# Patient Record
Sex: FEMALE | Race: WHITE
[De-identification: names, ages, dates, MRNs, and addresses within clinical notes are randomized per-mention and may not be internally consistent; named-entity substitution may affect disease eponyms.]

---

## 2017-02-12 ENCOUNTER — HOSPITAL ENCOUNTER (EMERGENCY)
Dept: HOSPITAL 25 - UCEAST | Age: 32
Discharge: HOME | End: 2017-02-12
Payer: COMMERCIAL

## 2017-02-12 VITALS — DIASTOLIC BLOOD PRESSURE: 69 MMHG | SYSTOLIC BLOOD PRESSURE: 128 MMHG

## 2017-02-12 DIAGNOSIS — J20.9: Primary | ICD-10-CM

## 2017-02-12 DIAGNOSIS — Z88.0: ICD-10-CM

## 2017-02-12 PROCEDURE — 87651 STREP A DNA AMP PROBE: CPT

## 2017-02-12 PROCEDURE — G0463 HOSPITAL OUTPT CLINIC VISIT: HCPCS

## 2017-02-12 PROCEDURE — 99212 OFFICE O/P EST SF 10 MIN: CPT

## 2017-02-20 NOTE — UC
Taya DOUGLAS Claudia, scribed for Raisa Walker MD on 17 at 0817 .





 General HPI





- HPI Summary


HPI Summary: 





31 years old female presents to the [facility] with multi- Sx this week. Pt notes 

gradual onset of Sx over the past 6 days. She admits to cough, sore throat, 

congestion, HA and sinus pressure. She also admits to some skin diaphoresis and 

"feeling warm" but is unsure of her temperature. 





- History of Current Complaint


Chief Complaint: UCRespiratory


Stated Complaint: SINUS CONGESTION


Time Seen by Provider: 17 08:08


Hx Obtained From: Patient


Onset/Duration: Gradual Onset, Lasting Days - about 6 days, Still Present


Associated Signs & Symptoms: Positive: Headache





- Allergy/Home Medications


Allergies/Adverse Reactions: 


 Allergies











Allergy/AdvReac Type Severity Reaction Status Date / Time


 


Penicillins Allergy Severe Hives Verified 16 17:00











Home Medications: 


 Home Medications





Guaifenesin/Pseudo 600/60(NF) [Mucinex D 600/60 (NF)]  17 [History]











PMH/Surg Hx/FS Hx/Imm Hx


Previously Healthy: Yes


Endocrine History Of: 


   Denies: Diabetes, Thyroid Disease


Cardiovascular History Of: 


   Denies: Cardiac Disorders, Hypertension


Respiratory History Of: 


   Denies: COPD, Asthma, Pneumonia


GI/ History Of: 


   Denies: Ulcer





- Surgical History


Surgical History: Yes


Surgery Procedure, Year, and Place:  , [place], [place], TUBAL LIGATION 

16





- Family History


Known Family History: Positive: Hypertension





- Social History


Occupation: Employed Full-time


Lives: With Family


Alcohol Use: None


Substance Use Type: None


Smoking Status (MU): Never Smoked Tobacco





Review of Systems


Constitutional: Other - SKIN DIAPHORESIS


Skin: Other - NO RASH


Eyes: Negative


ENT: Sore Throat, Ear Ache - RIGHT EAR, Other - CONGESTION AND SINUS PRESSURE


Respiratory: Cough


Cardiovascular: Negative


Gastrointestinal: Negative


Genitourinary: Negative


Motor: Negative


Neurovascular: Negative


Musculoskeletal: Negative


Neurological: Headache


Psychological: Negative


All Other Systems Reviewed And Are Negative: Yes





Physical Exam


Triage Information Reviewed: Yes


Appearance: Well-Nourished


Vital Signs: 


 Initial Vital Signs











Temp  98 F   17 07:41


 


Pulse  87   17 07:41


 


Resp  16   17 07:41


 


BP  135/69   17 07:41


 


Pulse Ox  99   17 07:41











Vital Signs Reviewed: Yes


Eye Exam: Normal


ENT Exam: Other


ENT: Positive: Other: - REDNESS OF THE POSTERIOR PHARYNX WITH MIDLINE UVULA 

MILD SWELLING


Dental Exam: Normal


Neck exam: Normal


Respiratory Exam: Normal


Respiratory: Positive: Chest non-tender, Lungs clear, Normal breath sounds


Cardiovascular Exam: Normal


Cardiovascular: Positive: RRR, No Murmur, Pulses Normal, Brisk Capillary Refill


Abdominal Exam: Normal


Abdomen Description: Positive: Nontender, No Organomegaly, Soft


Musculoskeletal Exam: Normal


Musculoskeletal: Positive: Strength Intact


Neurological Exam: Normal


Neurological: Positive: Other: - nonfocal grossly intact


Psychological Exam: Normal - conversing easily and appropriately


Skin Exam: Normal, Other - no visible or reported rash





Re-Evaluation





- Re-Evaluation


  ** 1


Re-Evaluation Time: 09:31


Comment: Results of the strep raid test is discussed with pt. Pt declines 

inhaler Rx.





Course/Dx





- Course


Course Of Treatment: No new problems in ccc, considered differential diagnoses.

  URI - not getting better (worse), c/w bronchitis.  D/w pt.  Suspect 

concomitant post viral cough as well.  Seems pleased with care.  Questions 

answered as posed.





- Differential Dx - Multi-Symptom


Provider Diagnoses: acute bronchitis





Discharge





- Discharge Plan


Condition: Stable


Disposition: HOME


Prescriptions: 


Azithromyxin ARLIN (NF) [Z-Arlin (Zithromax) 250 mg tabs #6] 2 tab PO .TODAY, THEN 

1 DAILY #6 tab


Patient Education Materials:  Acute Bronchitis (ED)


Forms:  *Work Release


Referrals: 


Non Staff,Doctor [Primary Care Provider] - 


[facility] [place] [facility] REFERRAL [Outside] (follow up in 1-2 weeks )


Additional Instructions: 


Follow up with a primary care physician as soon as you are able. 


Seek medical attention for worse or new problems in the meantime. 





The documentation as recorded by the Taya dejesus Claudia accurately 

reflects the service I personally performed and the decisions made by me, Raisa Walker MD.

## 2017-09-28 ENCOUNTER — HOSPITAL ENCOUNTER (OUTPATIENT)
Dept: HOSPITAL 25 - OR | Age: 32
Discharge: HOME | End: 2017-09-28
Attending: OBSTETRICS & GYNECOLOGY
Payer: COMMERCIAL

## 2017-09-28 VITALS — SYSTOLIC BLOOD PRESSURE: 117 MMHG | DIASTOLIC BLOOD PRESSURE: 80 MMHG

## 2017-09-28 DIAGNOSIS — D49.0: ICD-10-CM

## 2017-09-28 DIAGNOSIS — N92.0: Primary | ICD-10-CM

## 2017-09-28 LAB
HCT VFR BLD AUTO: 39 % (ref 35–47)
HGB BLD-MCNC: 13.2 G/DL (ref 12–16)
MCH RBC QN AUTO: 30 PG (ref 27–31)
MCHC RBC AUTO-ENTMCNC: 34 G/DL (ref 31–36)
MCV RBC AUTO: 87 FL (ref 80–97)
RBC # BLD AUTO: 4.4 10^6/UL (ref 4–5.4)
WBC # BLD AUTO: 6.2 10^3/UL (ref 3.5–10.8)

## 2017-09-28 PROCEDURE — 88305 TISSUE EXAM BY PATHOLOGIST: CPT

## 2017-09-28 PROCEDURE — 85025 COMPLETE CBC W/AUTO DIFF WBC: CPT

## 2017-09-28 PROCEDURE — 86850 RBC ANTIBODY SCREEN: CPT

## 2017-09-28 PROCEDURE — 36415 COLL VENOUS BLD VENIPUNCTURE: CPT

## 2017-09-28 PROCEDURE — 86901 BLOOD TYPING SEROLOGIC RH(D): CPT

## 2017-09-28 PROCEDURE — 86900 BLOOD TYPING SEROLOGIC ABO: CPT

## 2017-09-28 RX ADMIN — OXYCODONE HYDROCHLORIDE PRN MG: 5 CAPSULE ORAL at 10:07

## 2017-09-28 RX ADMIN — OXYCODONE HYDROCHLORIDE PRN MG: 5 CAPSULE ORAL at 09:15

## 2017-09-28 NOTE — OP
OPERATIVE REPORT:

 

DATE OF OPERATION:  09/28/17 - SDS

 

DATE OF BIRTH:  [birth year]

 

SURGEON:  Kannan Xavier MD.

 

ASSISTANT:  None.



ANESTHESIOLOGIST:  Oswald Dc MD.

 

ANESTHESIA:  General endotracheal tube.

 

PRE-OP DIAGNOSIS:  Menorrhagia.

 

POST-OP DIAGNOSIS:  Menorrhagia.

 

OPERATIVE PROCEDURE:  D and C, hysteroscopy, endometrial ablation.

 

ESTIMATED BLOOD LOSS:  Minimal.

 

SPECIMEN:  Includes endometrium.

 

FINDINGS:  Includes large normal cavity, secondary prolapse.

 

DESCRIPTION OF PROCEDURE:  Patient identified, procedure identified as D and C, 
hysteroscopy, and endometrial ablation.  The patient was taken to the operating 
room, prepped and draped in the usual fashion in dorsal lithotomy position 
under general anesthesia.  A single-tooth tenaculum was placed on the anterior 
lip of the cervix.  The cervix was sounded to 10 cm.  The cervical length was 
sounded to 4 cm, making the cavity length 6 cm.  The hysteroscope was inserted 
and above findings were noted.  A sharp curette was inserted and sharp 
curettage performed with good tissue obtained.  The NovaSure device was opened.
  The array was checked.  The device was placed within the uterine cavity.  The 
NovaSure was then manipulated to a cavity width of 4.3 cm, making the power 
setting of 142.  The CO2 perforation test was performed and the device passed.  
The device was enabled.  Then NovaSure ablation took place for 1 minute 9 
seconds.  At the end of the procedure, the device was removed and the 
hysteroscope was reinserted and a good ablation was noted and no perforations 
or injuries were noted.  All instruments were removed from the vagina.  
Hemostasis was achieved with silver nitrate sticks and the patient returned to 
the recovery room in stable condition.  All sponge, instrument counts were 
correct.

 

 [id number]/[facility] #: [id number]

[facility]

## 2018-06-20 ENCOUNTER — HOSPITAL ENCOUNTER (EMERGENCY)
Dept: HOSPITAL 25 - UCEAST | Age: 33
Discharge: HOME | End: 2018-06-20
Payer: COMMERCIAL

## 2018-06-20 VITALS — SYSTOLIC BLOOD PRESSURE: 138 MMHG | DIASTOLIC BLOOD PRESSURE: 78 MMHG

## 2018-06-20 DIAGNOSIS — J02.9: Primary | ICD-10-CM

## 2018-06-20 PROCEDURE — G0463 HOSPITAL OUTPT CLINIC VISIT: HCPCS

## 2018-06-20 PROCEDURE — 87651 STREP A DNA AMP PROBE: CPT

## 2018-06-20 PROCEDURE — 99212 OFFICE O/P EST SF 10 MIN: CPT

## 2018-06-20 NOTE — UC
Throat Pain/Nasal Osiel HPI





- HPI Summary


HPI Summary: 


STARTED FEELING UNWELL ABOUT 2 WEEKS AGO WITH HEADACHE AND MALAISE.  FOR THE 

PAST 4 DAYS HAS HAD SORE THROAT, PAIN WITH SWALLOWING AND SUBJECTIVE FEVER/

CHILLS.  HAS NASAL CONGESTION AND DRAINAGE.  NO EAR PAIN OR NAUSEA/VOMITING.





- History of Current Complaint


Chief Complaint: UCGeneralIllness


Stated Complaint: SINUS ISSUE


Time Seen by Provider: 18 07:15


Hx Obtained From: Patient


Hx Last Menstrual Period: dec


Onset/Duration: Gradual Onset, Lasting Days, Still Present


Severity: Moderate


Pain Intensity: 8


Pain Scale Used: 0-10 Numeric


Cough: None


Associated Signs & Symptoms: Positive: Fever





- Allergies/Home Medications


Allergies/Adverse Reactions: 


 Allergies











Allergy/AdvReac Type Severity Reaction Status Date / Time


 


Penicillins Allergy  Hives Verified 18 07:15














PMH/Surg Hx/FS Hx/Imm Hx


Previously Healthy: Yes





- Surgical History


Surgical History: Yes


Surgery Procedure, Year, and Place:  , [place], [place], TUBAL LIGATION 

16 [facility]





- Family History


Known Family History: 


   Negative: Hypertension





- Social History


Alcohol Use: None


Substance Use Type: None


Smoking Status (MU): Never Smoked Tobacco





- Immunization History


Most Recent Influenza Vaccination: 10/2017





Review of Systems


Constitutional: Fever, Chills, Fatigue


ENT: Sore Throat, Nasal Discharge


Respiratory: Negative


Cardiovascular: Negative


Gastrointestinal: Negative


Neurological: Headache


All Other Systems Reviewed And Are Negative: Yes





Physical Exam


Triage Information Reviewed: Yes


Appearance: Well-Appearing, No Pain Distress, Well-Nourished


Vital Signs: 


 Initial Vital Signs











Temp  98.4 F   18 07:17


 


Pulse  89   18 07:17


 


Resp  17   18 07:17


 


BP  138/78   18 07:17


 


Pulse Ox  98   18 07:17











Vital Signs Reviewed: Yes


Eyes: Positive: Conjunctiva Clear


ENT: Positive: Hearing grossly normal, Pharyngeal erythema, TMs normal, 

Tonsillar swelling.  Negative: Tonsillar exudate


Neck: Positive: Supple, Tenderness @ - minimally tender spfl cervical lad, 

Enlarged Nodes @ - mild spfl cervical lad


Respiratory Exam: Normal


Cardiovascular Exam: Normal


Abdomen Description: Positive: Soft


Musculoskeletal: Positive: No Edema


Neurological: Positive: Alert


Psychological: Positive: Age Appropriate Behavior


Skin: Negative: rashes





Diagnostics





- Laboratory


Diagnostic Studies Completed/Ordered: STREP NEG





Throat Pain/Nasal Course/Dx





- Differential Dx/Diagnosis


Provider Diagnoses: ACUTE PHARYNGITIS/URI





Discharge





- Sign-Out/Discharge


Documenting (check all that apply): Discharge/Admit/Transfer





- Discharge Plan


Condition: Stable


Disposition: HOME


Prescriptions: 


Azithromycin 500 mg PO DAILY #5 tab


Patient Education Materials:  Pharyngitis (ED), Upper Respiratory Infection (ED)


Referrals: 


Karen Harvey MD [Primary Care Provider] - If Needed


Additional Instructions: 


YOUR SYMPTOMS MAY BE VIRALLY MEDIATED BUT GIVEN THE LENGTH OF TIME YOU HAVE 

BEEN ILL AND YOUR UPCOMING TRAVEL WE WILL COVER YOU WITH ANTIBIOTICS. IF YOU 

START THE MEDICINE BE SURE TO TAKE IT FOR THE FULL COURSE. REST, HYDRATE, OTC 

MEDS AS NEEDED. SEEK FOLLOW-UP WITH YOUR PCP IF YOU ARE NOT IMPROVING OVER THE 

NEXT 1-2 WEEKS.





- Billing Disposition and Condition


Condition: STABLE


Disposition: Home